# Patient Record
(demographics unavailable — no encounter records)

---

## 2024-10-22 NOTE — ASSESSMENT
[FreeTextEntry1] : 1. Prostate carcinoma, stage T1c , GG 1 LR -- on AS- stable 2. Solitary RT kidney. 3. hematuria -resolved- likely secondary to large volume (185gm) prostate/varices- on avodart    Plan: -continue avodart/finasteride 0.5mg -re-d/w patient the need for an active surveillance biopsy as he has not had one since 2018- he refuses- -I also re-explained the risk of AUR post biopsy given a 200gm prostate- he understands -pt states he is satisfied with the results of the MRI showing no lesions and will continue to monitor with PSA and MRI when needed -UA with micro will call if clinically indicated -rto 6  months with a repeat PSA   -total time spent with encounter including face to face time with patient and review of chart and plan totaled 25 min

## 2024-10-22 NOTE — PHYSICAL EXAM
[General Appearance - Well Developed] : well developed [Normal Appearance] : normal appearance [Well Groomed] : well groomed [General Appearance - In No Acute Distress] : no acute distress [Abdomen Soft] : soft [Abdomen Tenderness] : non-tender [Costovertebral Angle Tenderness] : no ~M costovertebral angle tenderness [] : no respiratory distress [Respiration, Rhythm And Depth] : normal respiratory rhythm and effort [Exaggerated Use Of Accessory Muscles For Inspiration] : no accessory muscle use [Oriented To Time, Place, And Person] : oriented to person, place, and time [Affect] : the affect was normal [Mood] : the mood was normal [Not Anxious] : not anxious [Normal Station and Gait] : the gait and station were normal for the patient's age [No Focal Deficits] : no focal deficits [FreeTextEntry1] : global

## 2024-10-22 NOTE — HISTORY OF PRESENT ILLNESS
[Urinary Frequency] : urinary frequency [Nocturia] : nocturia [Hematuria - Gross] : gross hematuria [None] : None [FreeTextEntry1] : 71 year old male with h/o of prostate CA, , stage T1c ,GG 1 , LR -- on active surveillance at [facility] [place], [place] since 2008. Patient is maintained on finasteride, reports no hematuria and a better stream. nocturia 0-1x He denies hematuria, dysuria, fever, nausea, and other constitutional symptoms.   History: s/p TURP? PVP? 10 years ago for AUR Prior prostate CA treatment was received at [facility] [place], [place]  from 2008 until 2019 when patient retired and had a change of  insurance.  Pt reports h/o right solitary kidney. 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 See below: 2008 - prostate needle biopsy TP 3+3 5% involvement 1 core Lt appex prostate carcinoma.  3/2009 - AS Prostate Bx neg 2/2014 - AS Prostate Bx - no malignancy 11/20/2015 - laser PVP of prostate *************************** surgical hx 2/2018 - AS prostate Bx - no malignancy 9/2018 - MP MRI  = 176 g pirad = 2 bph  2/21 mpMRI == 198 g W/ ml bph . no lesions. 10/22 MP MRI- 207gm, PIRADS 1 04/2024 MP MRI- 185cc volume,  No evidence of suspicious prostate lesion.   PSA: 2009 - 5.6 2010 - 5.7 2012 - 6.3 6/2020 - 16.9 // PSA density = .09 12/20   = 20.5  // PSAD = 0.10  5/21= 20.4 /  / PSAD= 0.10 //   PSADT= 39.4 months 10/21= 17.1  PSAD= 0.08 04/2022 PSA= 7.0 ** on avodart 10/2022 PSA= 6.8 **on avodart 5/2023   PSA= 7.5    17%  ** on finasteride 12/2023 PSA=  8.8  on avodart 10/2024 PSA=  6.4  on finasteride     Udip 10/22/2024-  +nitrates, protein @ 100  5/21 Urine cytology- no malignant cells 6/21 US= solitary RT kidney w/ 2 small cysts, bladder was not filled/ volume 34 cc, PVR= 11 cc, Large middle lobe / 2.4 cm bladder stone. Pelvic ctscan-  10/21-  marked prostatomegaly, no bladder diverticulum or stone TRUS- 10/21-  259gm KUB- phleboliths  [Urinary Urgency] : no urinary urgency [Weak Stream] : no weak stream [Dysuria] : no dysuria [Hematuria - Microscopic] : no microscopic hematuria [Fever] : no fever [Anorexia] : no anorexia